# Patient Record
Sex: MALE | Race: WHITE | NOT HISPANIC OR LATINO | Employment: FULL TIME | URBAN - METROPOLITAN AREA
[De-identification: names, ages, dates, MRNs, and addresses within clinical notes are randomized per-mention and may not be internally consistent; named-entity substitution may affect disease eponyms.]

---

## 2024-07-21 ENCOUNTER — HOSPITAL ENCOUNTER (EMERGENCY)
Facility: HOSPITAL | Age: 39
Discharge: HOME | End: 2024-07-21
Payer: COMMERCIAL

## 2024-07-21 VITALS
OXYGEN SATURATION: 99 % | HEART RATE: 98 BPM | RESPIRATION RATE: 16 BRPM | DIASTOLIC BLOOD PRESSURE: 99 MMHG | BODY MASS INDEX: 29.66 KG/M2 | WEIGHT: 211.86 LBS | SYSTOLIC BLOOD PRESSURE: 146 MMHG | TEMPERATURE: 97.9 F | HEIGHT: 71 IN

## 2024-07-21 DIAGNOSIS — R11.2 NAUSEA AND VOMITING, UNSPECIFIED VOMITING TYPE: Primary | ICD-10-CM

## 2024-07-21 LAB
ALBUMIN SERPL-MCNC: 4.8 G/DL (ref 3.5–5)
ALP BLD-CCNC: 77 U/L (ref 35–125)
ALT SERPL-CCNC: 24 U/L (ref 5–40)
ANION GAP SERPL CALC-SCNC: 14 MMOL/L
AST SERPL-CCNC: 16 U/L (ref 5–40)
BASOPHILS # BLD AUTO: 0.03 X10*3/UL (ref 0–0.1)
BASOPHILS NFR BLD AUTO: 0.2 %
BILIRUB SERPL-MCNC: 0.4 MG/DL (ref 0.1–1.2)
BUN SERPL-MCNC: 10 MG/DL (ref 8–25)
CALCIUM SERPL-MCNC: 10.3 MG/DL (ref 8.5–10.4)
CHLORIDE SERPL-SCNC: 100 MMOL/L (ref 97–107)
CO2 SERPL-SCNC: 22 MMOL/L (ref 24–31)
CREAT SERPL-MCNC: 0.9 MG/DL (ref 0.4–1.6)
EGFRCR SERPLBLD CKD-EPI 2021: >90 ML/MIN/1.73M*2
EOSINOPHIL # BLD AUTO: 0.02 X10*3/UL (ref 0–0.7)
EOSINOPHIL NFR BLD AUTO: 0.1 %
ERYTHROCYTE [DISTWIDTH] IN BLOOD BY AUTOMATED COUNT: 12.4 % (ref 11.5–14.5)
GLUCOSE SERPL-MCNC: 123 MG/DL (ref 65–99)
HCT VFR BLD AUTO: 49.7 % (ref 41–52)
HGB BLD-MCNC: 17 G/DL (ref 13.5–17.5)
IMM GRANULOCYTES # BLD AUTO: 0.06 X10*3/UL (ref 0–0.7)
IMM GRANULOCYTES NFR BLD AUTO: 0.3 % (ref 0–0.9)
LACTATE BLDV-SCNC: 1.6 MMOL/L (ref 0.4–2)
LIPASE SERPL-CCNC: 37 U/L (ref 16–63)
LYMPHOCYTES # BLD AUTO: 2.17 X10*3/UL (ref 1.2–4.8)
LYMPHOCYTES NFR BLD AUTO: 11.2 %
MAGNESIUM SERPL-MCNC: 1.9 MG/DL (ref 1.6–3.1)
MCH RBC QN AUTO: 28.6 PG (ref 26–34)
MCHC RBC AUTO-ENTMCNC: 34.2 G/DL (ref 32–36)
MCV RBC AUTO: 84 FL (ref 80–100)
MONOCYTES # BLD AUTO: 1.1 X10*3/UL (ref 0.1–1)
MONOCYTES NFR BLD AUTO: 5.7 %
NEUTROPHILS # BLD AUTO: 16.02 X10*3/UL (ref 1.2–7.7)
NEUTROPHILS NFR BLD AUTO: 82.5 %
NRBC BLD-RTO: 0 /100 WBCS (ref 0–0)
PLATELET # BLD AUTO: 340 X10*3/UL (ref 150–450)
POTASSIUM SERPL-SCNC: 3.3 MMOL/L (ref 3.4–5.1)
PROT SERPL-MCNC: 8.4 G/DL (ref 5.9–7.9)
RBC # BLD AUTO: 5.95 X10*6/UL (ref 4.5–5.9)
SODIUM SERPL-SCNC: 136 MMOL/L (ref 133–145)
WBC # BLD AUTO: 19.4 X10*3/UL (ref 4.4–11.3)

## 2024-07-21 PROCEDURE — 83605 ASSAY OF LACTIC ACID: CPT

## 2024-07-21 PROCEDURE — 2500000001 HC RX 250 WO HCPCS SELF ADMINISTERED DRUGS (ALT 637 FOR MEDICARE OP)

## 2024-07-21 PROCEDURE — 2500000004 HC RX 250 GENERAL PHARMACY W/ HCPCS (ALT 636 FOR OP/ED)

## 2024-07-21 PROCEDURE — 87040 BLOOD CULTURE FOR BACTERIA: CPT | Mod: TRILAB

## 2024-07-21 PROCEDURE — 2500000002 HC RX 250 W HCPCS SELF ADMINISTERED DRUGS (ALT 637 FOR MEDICARE OP, ALT 636 FOR OP/ED)

## 2024-07-21 PROCEDURE — 85025 COMPLETE CBC W/AUTO DIFF WBC: CPT

## 2024-07-21 PROCEDURE — 96361 HYDRATE IV INFUSION ADD-ON: CPT

## 2024-07-21 PROCEDURE — 96375 TX/PRO/DX INJ NEW DRUG ADDON: CPT

## 2024-07-21 PROCEDURE — 36415 COLL VENOUS BLD VENIPUNCTURE: CPT

## 2024-07-21 PROCEDURE — 83690 ASSAY OF LIPASE: CPT

## 2024-07-21 PROCEDURE — 99284 EMERGENCY DEPT VISIT MOD MDM: CPT

## 2024-07-21 PROCEDURE — 96374 THER/PROPH/DIAG INJ IV PUSH: CPT

## 2024-07-21 PROCEDURE — 83735 ASSAY OF MAGNESIUM: CPT

## 2024-07-21 PROCEDURE — 80053 COMPREHEN METABOLIC PANEL: CPT

## 2024-07-21 RX ORDER — DICYCLOMINE HYDROCHLORIDE 10 MG/1
10 CAPSULE ORAL ONCE
Status: COMPLETED | OUTPATIENT
Start: 2024-07-21 | End: 2024-07-21

## 2024-07-21 RX ORDER — POTASSIUM CHLORIDE 20 MEQ/1
40 TABLET, EXTENDED RELEASE ORAL ONCE
Status: COMPLETED | OUTPATIENT
Start: 2024-07-21 | End: 2024-07-21

## 2024-07-21 RX ORDER — FAMOTIDINE 20 MG/1
20 TABLET, FILM COATED ORAL 2 TIMES DAILY
Qty: 30 TABLET | Refills: 0 | Status: SHIPPED | OUTPATIENT
Start: 2024-07-21 | End: 2024-08-05

## 2024-07-21 RX ORDER — ONDANSETRON HYDROCHLORIDE 2 MG/ML
4 INJECTION, SOLUTION INTRAVENOUS ONCE
Status: COMPLETED | OUTPATIENT
Start: 2024-07-21 | End: 2024-07-21

## 2024-07-21 RX ORDER — DICYCLOMINE HYDROCHLORIDE 20 MG/1
20 TABLET ORAL 2 TIMES DAILY
Qty: 20 TABLET | Refills: 0 | Status: SHIPPED | OUTPATIENT
Start: 2024-07-21 | End: 2024-07-31

## 2024-07-21 RX ORDER — ONDANSETRON 4 MG/1
4 TABLET, FILM COATED ORAL EVERY 6 HOURS
Qty: 12 TABLET | Refills: 0 | Status: SHIPPED | OUTPATIENT
Start: 2024-07-21 | End: 2024-07-24

## 2024-07-21 RX ORDER — FAMOTIDINE 10 MG/ML
20 INJECTION INTRAVENOUS ONCE
Status: COMPLETED | OUTPATIENT
Start: 2024-07-21 | End: 2024-07-21

## 2024-07-21 ASSESSMENT — COLUMBIA-SUICIDE SEVERITY RATING SCALE - C-SSRS
2. HAVE YOU ACTUALLY HAD ANY THOUGHTS OF KILLING YOURSELF?: NO
6. HAVE YOU EVER DONE ANYTHING, STARTED TO DO ANYTHING, OR PREPARED TO DO ANYTHING TO END YOUR LIFE?: NO
1. IN THE PAST MONTH, HAVE YOU WISHED YOU WERE DEAD OR WISHED YOU COULD GO TO SLEEP AND NOT WAKE UP?: NO

## 2024-07-21 ASSESSMENT — PAIN - FUNCTIONAL ASSESSMENT: PAIN_FUNCTIONAL_ASSESSMENT: 0-10

## 2024-07-21 ASSESSMENT — PAIN SCALES - GENERAL: PAINLEVEL_OUTOF10: 5 - MODERATE PAIN

## 2024-07-21 NOTE — ED NOTES
Patient refusing to stay for blood culture and lactate results. PA aware     Clarita Vaughn, RN  07/21/24 2957

## 2024-07-21 NOTE — ED PROVIDER NOTES
HPI   Chief Complaint   Patient presents with    Vomiting     Pt states he ate a gas station burrito last night and about 30 minutes later had abd pain, n/v and diarrhea.         Patient is a 39-year-old male presenting to the emergency department for evaluation of possible food poisoning.  Patient states last night around 3 AM he ate a gas station [employer] and within 30 minutes he was nauseous, vomiting, and had diarrhea.  He states he was up all night with diarrhea.  He states he has not been able to keep any food or liquid down all day.  He states he will try to take a sip of water and will vomit it right back up.  He also tried taking Pepto-Bismol and vomited right after.  He denies any chest pain, shortness of breath, fever, chills, abdominal pain, cough, congestion, headaches, numbness, tingling.               Patient History   History reviewed. No pertinent past medical history.  History reviewed. No pertinent surgical history.  No family history on file.  Social History     Tobacco Use    Smoking status: Not on file    Smokeless tobacco: Not on file   Substance Use Topics    Alcohol use: Not on file    Drug use: Not on file       Physical Exam   ED Triage Vitals [07/21/24 1529]   Temperature Heart Rate Respirations BP   36.6 °C (97.9 °F) (!) 130 16 (!) 146/99      Pulse Ox Temp src Heart Rate Source Patient Position   99 % -- -- --      BP Location FiO2 (%)     -- --       Physical Exam  Vitals and nursing note reviewed.   Constitutional:       General: He is not in acute distress.     Appearance: Normal appearance. He is not ill-appearing or toxic-appearing.   HENT:      Head: Normocephalic and atraumatic.      Nose: Nose normal.      Mouth/Throat:      Mouth: Mucous membranes are dry.   Eyes:      Pupils: Pupils are equal, round, and reactive to light.   Cardiovascular:      Rate and Rhythm: Regular rhythm. Tachycardia present.      Pulses: Normal pulses.      Heart sounds: No murmur heard.     No friction  rub. No gallop.   Pulmonary:      Effort: Pulmonary effort is normal.      Breath sounds: Normal breath sounds. No wheezing, rhonchi or rales.   Abdominal:      Palpations: Abdomen is soft.      Tenderness: There is no abdominal tenderness. There is no guarding or rebound.   Musculoskeletal:         General: Normal range of motion.      Cervical back: Normal range of motion.   Skin:     General: Skin is warm and dry.   Neurological:      General: No focal deficit present.      Mental Status: He is alert and oriented to person, place, and time.      Sensory: No sensory deficit.      Motor: No weakness.   Psychiatric:         Mood and Affect: Mood normal.         Behavior: Behavior normal.           ED Course & MDM   ED Course as of 07/21/24 1840   Sun Jul 21, 2024 1817 Heart Rate(!): 130 [AJ]      ED Course User Index  [AJ] Antonia Post PA-C         Diagnoses as of 07/21/24 1840   Nausea and vomiting, unspecified vomiting type                       [place] Coma Scale Score: 15                        Medical Decision Making  **Disclaimer parts of this chart have been completed using voice recognition software. Please excuse any errors of transcription.     Evaluated this patient independently and my supervising physician was available for consultation.    HPI: Detailed above.    Exam: A medically appropriate exam performed, outlined above, given the known history and presentation.    History obtained from: Patient    Labs/Diagnostics:  Labs Reviewed   CBC WITH AUTO DIFFERENTIAL - Abnormal       Result Value    WBC 19.4 (*)     nRBC 0.0      RBC 5.95 (*)     Hemoglobin 17.0      Hematocrit 49.7      MCV 84      MCH 28.6      MCHC 34.2      RDW 12.4      Platelets 340      Neutrophils % 82.5      Immature Granulocytes %, Automated 0.3      Lymphocytes % 11.2      Monocytes % 5.7      Eosinophils % 0.1      Basophils % 0.2      Neutrophils Absolute 16.02 (*)     Immature Granulocytes Absolute, Automated 0.06       Lymphocytes Absolute 2.17      Monocytes Absolute 1.10 (*)     Eosinophils Absolute 0.02      Basophils Absolute 0.03     COMPREHENSIVE METABOLIC PANEL - Abnormal    Glucose 123 (*)     Sodium 136      Potassium 3.3 (*)     Chloride 100      Bicarbonate 22 (*)     Urea Nitrogen 10      Creatinine 0.90      eGFR >90      Calcium 10.3      Albumin 4.8      Alkaline Phosphatase 77      Total Protein 8.4 (*)     AST 16      Bilirubin, Total 0.4      ALT 24      Anion Gap 14     LIPASE - Normal    Lipase 37     MAGNESIUM - Normal    Magnesium 1.90     BLOOD GAS LACTIC ACID, VENOUS - Normal    POCT Lactate, Venous 1.6     BLOOD CULTURE   BLOOD CULTURE     EMERGENCY DEPARTMENT COURSE and DIFFERENTIAL DIAGNOSIS/MDM:  Patient is a 39-year-old male presenting to the emergency department for evaluation of nausea, vomiting, and diarrhea.  On physical exam vital signs remarkable for hypertension and tachycardia but otherwise stable and patient is in no acute distress.  Suspect patient is tachycardic due to dehydration.  Abdomen is soft and nontender to palpation with no rebound or guarding.  Diagnostic labs ordered as well as normal saline, Pepcid, Zofran, and Bentyl.  CBC came back with leukocytosis but no anemia.  Due to the leukocytosis and tachycardia patient was triggering sepsis.  Therefore blood cultures and lactic acid ordered.  Do not feel patient is septic at this time therefore did not feel antibiotics were necessary however I did order 2 L of fluid.  CMP remarkable for potassium of 3.3 therefore patient was given 40 mill equivalents of oral potassium.  Suspect potassium is low due to the diarrhea and vomiting.  Lipase normal.  Magnesium normal.  Patient nontender on exam therefore do not feel that CT scan is necessary.  Patient is feeling much better after fluids and medications.  However due to refusing the lactic acid and blood cultures patient did have a signout AGAINST MEDICAL ADVICE.  I did tell the patient that  "the blood cultures are important to make sure that there is no infection throughout his body however he states he does not want them done at this time and is feeling better and wants to go home.  I told him that if patient goes home and gets worse he could die if he is truly septic patient voiced understanding and does not feel that he is septic.  Patient then decided that he would let us draw the blood cultures and lactic acid.  Lactic came back normal.  Patient's heart rate improved after fluids.  Patient was discharged in stable condition.  Suspect patient had food poisoning from the [employer].  He tolerated oral intake prior to discharge.  He will follow-up with primary care physician outpatient within the next 1 to 2 days.  He will return to the emergency department with any new or worsening symptoms.    The patient presented with a chief complaint of nausea, vomiting, diarrhea. The differential diagnosis associated with this patient's presentation includes viral gastritis, acute abdomen, food poisoning.     Vitals:    Vitals:    07/21/24 1529 07/21/24 1745   BP: (!) 146/99    Pulse: (!) 130 98   Resp: 16 16   Temp: 36.6 °C (97.9 °F)    SpO2: 99%    Weight: 96.1 kg (211 lb 13.8 oz)    Height: 1.803 m (5' 11\")      History Limited by:    None    Independent history obtained from:    None    External records reviewed:    None    Diagnostics interpreted by me:    None    Discussions with other clinicians:    None    Chronic conditions impacting care:    None    Social determinants of health affecting care:    None    Diagnostic tests considered but not performed: CT of the abdomen considered however patient is nontender to palpation and abdomen is soft    ED Medications managed:    Medications   ondansetron (Zofran) injection 4 mg (4 mg intravenous Given 7/21/24 1543)   sodium chloride 0.9 % bolus 1,000 mL (0 mL intravenous Stopped 7/21/24 1642)   famotidine PF (Pepcid) injection 20 mg (20 mg intravenous Given 7/21/24 " 1543)   dicyclomine (Bentyl) capsule 10 mg (10 mg oral Given 7/21/24 1543)   sodium chloride 0.9 % bolus 1,000 mL (0 mL intravenous Stopped 7/21/24 1740)   potassium chloride CR (Klor-Con M20) ER tablet 40 mEq (40 mEq oral Given 7/21/24 1635)       Prescription drugs considered:     Pepcid and Bentyl    Screenings:              Procedure  Procedures     Antonia Post PA-C  07/21/24 4445

## 2024-07-26 LAB
BACTERIA BLD CULT: NORMAL
BACTERIA BLD CULT: NORMAL

## 2024-10-19 ENCOUNTER — HOSPITAL ENCOUNTER (EMERGENCY)
Facility: HOSPITAL | Age: 39
Discharge: HOME | End: 2024-10-19
Payer: COMMERCIAL

## 2024-10-19 VITALS
HEIGHT: 71 IN | OXYGEN SATURATION: 100 % | RESPIRATION RATE: 16 BRPM | HEART RATE: 65 BPM | WEIGHT: 200 LBS | SYSTOLIC BLOOD PRESSURE: 138 MMHG | BODY MASS INDEX: 28 KG/M2 | DIASTOLIC BLOOD PRESSURE: 92 MMHG | TEMPERATURE: 99 F

## 2024-10-19 DIAGNOSIS — S01.81XA FACIAL LACERATION, INITIAL ENCOUNTER: Primary | ICD-10-CM

## 2024-10-19 PROCEDURE — 99283 EMERGENCY DEPT VISIT LOW MDM: CPT

## 2024-10-19 PROCEDURE — 2500000004 HC RX 250 GENERAL PHARMACY W/ HCPCS (ALT 636 FOR OP/ED)

## 2024-10-19 PROCEDURE — 12011 RPR F/E/E/N/L/M 2.5 CM/<: CPT

## 2024-10-19 RX ORDER — LIDOCAINE HYDROCHLORIDE 10 MG/ML
10 INJECTION, SOLUTION INFILTRATION; PERINEURAL ONCE
Status: COMPLETED | OUTPATIENT
Start: 2024-10-19 | End: 2024-10-19

## 2024-10-19 RX ADMIN — LIDOCAINE HYDROCHLORIDE 10 ML: 10 INJECTION, SOLUTION INFILTRATION; PERINEURAL at 21:17

## 2024-10-19 ASSESSMENT — PAIN SCALES - GENERAL
PAINLEVEL_OUTOF10: 0 - NO PAIN
PAINLEVEL_OUTOF10: 2

## 2024-10-19 ASSESSMENT — PAIN - FUNCTIONAL ASSESSMENT
PAIN_FUNCTIONAL_ASSESSMENT: 0-10
PAIN_FUNCTIONAL_ASSESSMENT: 0-10

## 2024-10-19 ASSESSMENT — PAIN DESCRIPTION - LOCATION: LOCATION: FACE
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
## 2024-10-30 ENCOUNTER — TELEPHONE (OUTPATIENT)
Dept: PRIMARY CARE | Facility: CLINIC | Age: 39
End: 2024-10-30
Payer: COMMERCIAL

## 2024-11-04 ENCOUNTER — APPOINTMENT (OUTPATIENT)
Dept: PRIMARY CARE | Facility: CLINIC | Age: 39
End: 2024-11-04
Payer: COMMERCIAL

## 2024-11-04 VITALS
WEIGHT: 216 LBS | SYSTOLIC BLOOD PRESSURE: 118 MMHG | OXYGEN SATURATION: 97 % | BODY MASS INDEX: 30.24 KG/M2 | HEART RATE: 74 BPM | HEIGHT: 71 IN | DIASTOLIC BLOOD PRESSURE: 76 MMHG

## 2024-11-04 DIAGNOSIS — Z48.02 ENCOUNTER FOR REMOVAL OF SUTURES: Primary | ICD-10-CM

## 2024-11-04 PROCEDURE — 3008F BODY MASS INDEX DOCD: CPT | Performed by: FAMILY MEDICINE

## 2024-11-04 PROCEDURE — S0630 REMOVAL OF SUTURES: HCPCS

## 2024-11-04 PROCEDURE — 99213 OFFICE O/P EST LOW 20 MIN: CPT | Performed by: FAMILY MEDICINE

## 2024-11-04 ASSESSMENT — PATIENT HEALTH QUESTIONNAIRE - PHQ9
2. FEELING DOWN, DEPRESSED OR HOPELESS: NOT AT ALL
SUM OF ALL RESPONSES TO PHQ9 QUESTIONS 1 AND 2: 0
1. LITTLE INTEREST OR PLEASURE IN DOING THINGS: NOT AT ALL
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
## 2024-11-05 NOTE — PROGRESS NOTES
I reviewed and examined the patient. I was present for the key exam elements, and I fully participated in the patient's care. I discussed the management of the care with the resident. I have personally reviewed the pertinent labs and imaging, as well as recent notes, with the patient. I have reviewed the note above and agree with the resident's medical decision making as documented in the resident's note, in addition to the following comments / findings:     Agree with the rest of the plan outlined below by resident physician. No red flags.      The patient understands and agrees to the assessment and plan of care. Patient has also agreed to follow up and comply with the treatment and evaluation as recommended today. Patient was instructed to call the office at [phone] should questions arise regarding their treatment or care.     Darren Monroy DO, FAOASM  Family Medicine   [address], [address]  [place] [address]     Darren Monroy DO

## 2025-05-05 ENCOUNTER — APPOINTMENT (OUTPATIENT)
Dept: PRIMARY CARE | Facility: CLINIC | Age: 40
End: 2025-05-05
Payer: COMMERCIAL

## 2025-05-05 VITALS
OXYGEN SATURATION: 99 % | HEART RATE: 64 BPM | HEIGHT: 71 IN | DIASTOLIC BLOOD PRESSURE: 84 MMHG | BODY MASS INDEX: 29.54 KG/M2 | WEIGHT: 211 LBS | SYSTOLIC BLOOD PRESSURE: 118 MMHG

## 2025-05-05 DIAGNOSIS — Z13.29 SCREENING FOR ENDOCRINE DISORDER: ICD-10-CM

## 2025-05-05 DIAGNOSIS — F17.200 VAPING NICOTINE DEPENDENCE, NON-TOBACCO PRODUCT: ICD-10-CM

## 2025-05-05 DIAGNOSIS — Z13.0 SCREENING FOR DISORDER OF BLOOD AND BLOOD-FORMING ORGANS: ICD-10-CM

## 2025-05-05 DIAGNOSIS — Z13.220 LIPID SCREENING: ICD-10-CM

## 2025-05-05 DIAGNOSIS — Z00.00 ANNUAL WELLNESS VISIT: Primary | ICD-10-CM

## 2025-05-05 DIAGNOSIS — Z23 NEED FOR DIPHTHERIA-TETANUS-PERTUSSIS (TDAP) VACCINE: ICD-10-CM

## 2025-05-05 DIAGNOSIS — Z13.1 SCREENING FOR DIABETES MELLITUS: ICD-10-CM

## 2025-05-05 DIAGNOSIS — G47.20 CIRCADIAN RHYTHM SLEEP DISORDER: ICD-10-CM

## 2025-05-05 DIAGNOSIS — H35.82 RETINAL ISCHEMIA: ICD-10-CM

## 2025-05-05 DIAGNOSIS — R53.83 FATIGUE, UNSPECIFIED TYPE: ICD-10-CM

## 2025-05-05 PROBLEM — Z48.02 ENCOUNTER FOR REMOVAL OF SUTURES: Status: RESOLVED | Noted: 2024-11-04 | Resolved: 2025-05-05

## 2025-05-05 PROBLEM — F17.210 DEPENDENCE ON NICOTINE FROM CIGARETTES: Status: ACTIVE | Noted: 2018-08-27

## 2025-05-05 PROCEDURE — 90471 IMMUNIZATION ADMIN: CPT

## 2025-05-05 PROCEDURE — 99396 PREV VISIT EST AGE 40-64: CPT

## 2025-05-05 PROCEDURE — 3008F BODY MASS INDEX DOCD: CPT

## 2025-05-05 PROCEDURE — 90715 TDAP VACCINE 7 YRS/> IM: CPT

## 2025-05-05 RX ORDER — BISMUTH SUBSALICYLATE 262 MG
1 TABLET,CHEWABLE ORAL DAILY
COMMUNITY

## 2025-05-05 ASSESSMENT — PATIENT HEALTH QUESTIONNAIRE - PHQ9
SUM OF ALL RESPONSES TO PHQ9 QUESTIONS 1 AND 2: 0
1. LITTLE INTEREST OR PLEASURE IN DOING THINGS: NOT AT ALL
2. FEELING DOWN, DEPRESSED OR HOPELESS: NOT AT ALL

## 2025-05-05 NOTE — PROGRESS NOTES
"Subjective   Patient ID: Da Jo is a 39 y.o. male who presents for Annual Exam.    HPI  Sleep: started working second shift 1.5 years ago, schedule slightly abnormal and occasionally has insomnia  Working on a making diet changes.   Working on adding regular exercise to their routine.  Outside time: getting mail, taking out garbage, walking to car.   Colonoscopy not due until Age 44.  Vaccines are not up to date; they are due for: tdap   Dental exam is up to date.  Vision exam is up to date. Foveal retinal ischemia. Has glasses but struggles with night vision/night driving      Social:   Caffeine use -  coffee or energy drinks on occasion   Tobacco use-  Used to smoke cigarettes, quit several years ago. 10 years 1/4 PPD, Now vapes with nicotine. Trying to quit.         Alcohol use- denies   Illicit substance use - denies     Occupation: Machinery/programming      Other concerns addressed today include:   Feeling low energy, not as much energy. Feels like he is slowing down since turning 39//job shift change.     Fam hx:   Dad with CLL   Family with diabetes     Review of Systems  All pertinent positive symptoms are included in the history of present illness.    All other systems have been reviewed and are negative and noncontributory to this patient's current ailments.     Social History     Tobacco Use    Smoking status: Unknown    Smokeless tobacco: Not on file   Substance Use Topics    Alcohol use: Defer      Surgical History[1]   Allergies[2]     Current Medications[3]     Problem List[4]   Immunization History   Administered Date(s) Administered    COVID-19, mRNA, LNP-S, PF, 30 mcg/0.3 mL dose 04/04/2021, 04/23/2021, 12/29/2021    Moderna COVID-19 vaccine, 12 years and older (50mcg/0.5mL)(Spikevax) 11/29/2023    Pfizer Gray Cap SARS-CoV-2 05/09/2022    Tdap vaccine, age 7 year and older (BOOSTRIX, ADACEL) 05/05/2025       Objective     VITALS  /84   Pulse 64   Ht 1.803 m (5' 11\")   Wt 95.7 kg " (211 lb)   SpO2 99%   BMI 29.43 kg/m²      Physical Exam  CONSTITUTIONAL -  well developed, looks like stated age, in no acute distress, not ill-appearing, and + tired appearing, pale appearance  SKIN - normal skin color and pigmentation, normal skin turgor without rash, lesions, or nodules visualized  HEAD - no trauma, normocephalic  EYES - pupils are equal and reactive to light, extraocular muscles are intact, and normal external exam  ENT - no injection, no signs of infection, uvula midline, normal tongue movement and throat normal, no exudate, nasal passage without discharge and patent  NECK - supple without rigidity, no neck mass was observed, no thyromegaly or thyroid nodules  RESPIRATORY - clear to auscultation, no wheezing, no crackles and no rales, good effort  CARDIAC - regular rate and regular rhythm, no skipped beats, no murmur  ABDOMEN - soft, nontender  EXTREMITIES - no edema, no deformities  NEUROLOGICAL - normal gait, normal balance, normal motor, no ataxia, DTRs equal and symmetrical; alert, oriented and no focal signs  PSYCHIATRIC - alert, pleasant and cordial, age-appropriate  IMMUNOLOGIC - no cervical lymphadenopathy     No results found for this or any previous visit (from the past 12 weeks).     Assessment/Plan   Problem List Items Addressed This Visit           ICD-10-CM    Annual wellness visit - Primary Z00.00    Relevant Orders    CBC and Auto Differential    Comprehensive Metabolic Panel    Lipid Panel    TSH with reflex to Free T4 if abnormal    Screening for endocrine disorder Z13.29    Relevant Orders    TSH with reflex to Free T4 if abnormal    Hemoglobin A1c    Screening for diabetes mellitus Z13.1    Relevant Orders    Hemoglobin A1c    Screening for disorder of blood and blood-forming organs Z13.0    Relevant Orders    CBC and Auto Differential    Lipid screening Z13.220    Relevant Orders    Lipid Panel    Retinal ischemia H35.82    Relevant Orders    Hemoglobin A1c    Circadian  rhythm sleep disorder G47.20    Vaping nicotine dependence, non-tobacco product F17.200    Need for diphtheria-tetanus-pertussis (Tdap) vaccine Z23    Relevant Orders    Tdap vaccine, age 7 years and older  (BOOSTRIX) (Completed)     38 y/o M appears for annual well visit  Labs   Tdap vaccine given in office today   Fatigue   Likely due to circadian rhythm disruption due to second shift change   Discussed at length sleep hygiene methods and provided instructions on AVS  Testosterone testing is not indicated at this time, get basic labs first and try lifestyle changes  Labs to rule out basic causes of fatigue such as anemia, etc  Return in 4-6 weeks if this is still a significant problem affecting daily life   Vaping   Discussed he is trying to quit  Re-visit smoking cessation at next office visit      I have personally reviewed all available pertinent labs, imaging, and consult notes with the patient.      All questions and concerns were addressed. Patient verbalizes understanding instructions and agrees with established plan of care.      Patient seen and discussed with Dr. Porfirio Soto, , MA   PGY-1 Pending sale to [facility]         [1] No past surgical history on file.  [2] No Known Allergies  [3]   Current Outpatient Medications   Medication Sig Dispense Refill    multivitamin tablet Take 1 tablet by mouth once daily.       No current facility-administered medications for this visit.   [4]   Patient Active Problem List  Diagnosis    Annual wellness visit    Dependence on nicotine from cigarettes    Screening for endocrine disorder    Screening for diabetes mellitus    Screening for disorder of blood and blood-forming organs    Lipid screening    Retinal ischemia    Circadian rhythm sleep disorder    Vaping nicotine dependence, non-tobacco product    Need for diphtheria-tetanus-pertussis (Tdap) vaccine

## 2025-05-05 NOTE — PROGRESS NOTES
I reviewed and examined the patient. I was present for the key exam elements, and I fully participated in the patient's care. I discussed the management of the care with the resident. I have personally reviewed the pertinent labs and imaging, as well as recent notes, with the patient. I have reviewed the note above and agree with the resident's medical decision making as documented in the resident's note, in addition to the following comments / findings:     Agree with the rest of the plan outlined below by resident physician. No red flags.      The patient understands and agrees to the assessment and plan of care. Patient has also agreed to follow up and comply with the treatment and evaluation as recommended today. Patient was instructed to call the office at [phone] should questions arise regarding their treatment or care.     Darren Monroy DO, FAOASM  Family Medicine   [address], [address]  [place] [address]     Darren Monroy DO

## 2025-05-05 NOTE — PATIENT INSTRUCTIONS
Today we discussed trying sleep hygiene methods:   -Avoiding technology at night   -Avoiding blue lights at night   -Avoiding stimulation before bed   -Keep your room dark and cold where you're sleeping   -You can use white noise to help you fall asleep    -You can try blue light blocking glasses at night  -Track your sleep with your iWatch or fitness tracker   -Physically go to bed in the bedroom.   -Keep the same daily sleep/wake schedule  -keep a similar meal time schedule daily

## 2025-05-13 LAB
ALBUMIN SERPL-MCNC: 4.5 G/DL (ref 3.6–5.1)
ALP SERPL-CCNC: 159 U/L (ref 36–130)
ALT SERPL-CCNC: 307 U/L (ref 9–46)
ANION GAP SERPL CALCULATED.4IONS-SCNC: 9 MMOL/L (CALC) (ref 7–17)
AST SERPL-CCNC: 224 U/L (ref 10–40)
BASOPHILS # BLD AUTO: 20 CELLS/UL (ref 0–200)
BASOPHILS NFR BLD AUTO: 0.2 %
BILIRUB SERPL-MCNC: 5.2 MG/DL (ref 0.2–1.2)
BUN SERPL-MCNC: 9 MG/DL (ref 7–25)
CALCIUM SERPL-MCNC: 9.5 MG/DL (ref 8.6–10.3)
CHLORIDE SERPL-SCNC: 105 MMOL/L (ref 98–110)
CHOLEST SERPL-MCNC: 162 MG/DL
CHOLEST/HDLC SERPL: 3.2 (CALC)
CO2 SERPL-SCNC: 25 MMOL/L (ref 20–32)
CREAT SERPL-MCNC: 0.91 MG/DL (ref 0.6–1.29)
EGFRCR SERPLBLD CKD-EPI 2021: 109 ML/MIN/1.73M2
EOSINOPHIL # BLD AUTO: 140 CELLS/UL (ref 15–500)
EOSINOPHIL NFR BLD AUTO: 1.4 %
ERYTHROCYTE [DISTWIDTH] IN BLOOD BY AUTOMATED COUNT: 12.8 % (ref 11–15)
EST. AVERAGE GLUCOSE BLD GHB EST-MCNC: 103 MG/DL
EST. AVERAGE GLUCOSE BLD GHB EST-SCNC: 5.7 MMOL/L
GLUCOSE SERPL-MCNC: 103 MG/DL (ref 65–99)
HBA1C MFR BLD: 5.2 %
HCT VFR BLD AUTO: 50.8 % (ref 38.5–50)
HDLC SERPL-MCNC: 50 MG/DL
HGB BLD-MCNC: 16.8 G/DL (ref 13.2–17.1)
LDLC SERPL CALC-MCNC: 94 MG/DL (CALC)
LYMPHOCYTES # BLD AUTO: 1990 CELLS/UL (ref 850–3900)
LYMPHOCYTES NFR BLD AUTO: 19.9 %
MCH RBC QN AUTO: 28.8 PG (ref 27–33)
MCHC RBC AUTO-ENTMCNC: 33.1 G/DL (ref 32–36)
MCV RBC AUTO: 87 FL (ref 80–100)
MONOCYTES # BLD AUTO: 890 CELLS/UL (ref 200–950)
MONOCYTES NFR BLD AUTO: 8.9 %
NEUTROPHILS # BLD AUTO: 6960 CELLS/UL (ref 1500–7800)
NEUTROPHILS NFR BLD AUTO: 69.6 %
NONHDLC SERPL-MCNC: 112 MG/DL (CALC)
PLATELET # BLD AUTO: 308 THOUSAND/UL (ref 140–400)
PMV BLD REES-ECKER: 11.1 FL (ref 7.5–12.5)
POTASSIUM SERPL-SCNC: 4.1 MMOL/L (ref 3.5–5.3)
PROT SERPL-MCNC: 7.3 G/DL (ref 6.1–8.1)
RBC # BLD AUTO: 5.84 MILLION/UL (ref 4.2–5.8)
SODIUM SERPL-SCNC: 139 MMOL/L (ref 135–146)
TRIGL SERPL-MCNC: 85 MG/DL
TSH SERPL-ACNC: 1.67 MIU/L (ref 0.4–4.5)
WBC # BLD AUTO: 10 THOUSAND/UL (ref 3.8–10.8)

## 2025-05-15 DIAGNOSIS — R17 ELEVATED BILIRUBIN: ICD-10-CM

## 2025-05-15 DIAGNOSIS — R74.8 ELEVATED LIVER ENZYMES: Primary | ICD-10-CM

## 2025-05-15 DIAGNOSIS — R71.8 ELEVATED HEMATOCRIT: ICD-10-CM

## 2025-05-15 DIAGNOSIS — R74.8 ELEVATED ALKALINE PHOSPHATASE LEVEL: ICD-10-CM

## 2025-06-23 ENCOUNTER — APPOINTMENT (OUTPATIENT)
Dept: RADIOLOGY | Facility: HOSPITAL | Age: 40
End: 2025-06-23
Payer: COMMERCIAL

## 2025-06-23 DIAGNOSIS — R17 ELEVATED BILIRUBIN: ICD-10-CM

## 2025-06-23 DIAGNOSIS — R74.8 ELEVATED LIVER ENZYMES: ICD-10-CM

## 2025-06-23 DIAGNOSIS — R74.8 ELEVATED ALKALINE PHOSPHATASE LEVEL: ICD-10-CM

## 2025-06-23 PROCEDURE — 76705 ECHO EXAM OF ABDOMEN: CPT

## 2025-06-23 PROCEDURE — 76705 ECHO EXAM OF ABDOMEN: CPT | Performed by: RADIOLOGY

## 2025-06-24 DIAGNOSIS — K80.20 CALCULUS OF GALLBLADDER WITHOUT CHOLECYSTITIS WITHOUT OBSTRUCTION: Primary | ICD-10-CM

## 2025-06-25 ENCOUNTER — TELEPHONE (OUTPATIENT)
Facility: CLINIC | Age: 40
End: 2025-06-25
Payer: COMMERCIAL

## 2025-06-25 NOTE — TELEPHONE ENCOUNTER
Patient saw the referral in [facility] after his ultrasound and had questions.

## 2025-06-26 DIAGNOSIS — B19.20 HEPATITIS C VIRUS INFECTION WITHOUT HEPATIC COMA, UNSPECIFIED CHRONICITY: Primary | ICD-10-CM

## 2025-06-26 LAB
BILIRUB DIRECT SERPL-MCNC: 0.1 MG/DL
BILIRUB SERPL-MCNC: 0.7 MG/DL (ref 0.2–1.2)
FERRITIN SERPL-MCNC: 75 NG/ML (ref 38–380)
GGT SERPL-CCNC: 33 U/L (ref 3–95)
HAV IGM SERPL QL IA: ABNORMAL
HBV CORE IGM SERPL QL IA: ABNORMAL
HBV SURFACE AG SERPL QL IA: ABNORMAL
HCV AB SERPL QL IA: REACTIVE
HCV RNA SERPL NAA+PROBE-ACNC: ABNORMAL IU/ML
HCV RNA SERPL NAA+PROBE-LOG IU: 5.73 LOG IU/ML
IRON SATN MFR SERPL: 24 % (CALC) (ref 20–48)
IRON SERPL-MCNC: 81 MCG/DL (ref 50–180)
QUEST HCV PCR (ALWAYS MESSAGE): ABNORMAL
TIBC SERPL-MCNC: 338 MCG/DL (CALC) (ref 250–425)

## 2025-07-01 ENCOUNTER — TELEPHONE (OUTPATIENT)
Facility: CLINIC | Age: 40
End: 2025-07-01
Payer: COMMERCIAL

## 2025-07-01 NOTE — TELEPHONE ENCOUNTER
Patient calling because they are looking for a referral for hematology that you said your going to place for them,

## 2025-07-03 DIAGNOSIS — B17.10 ACUTE HEPATITIS C VIRUS INFECTION WITHOUT HEPATIC COMA: Primary | ICD-10-CM

## 2025-07-11 ENCOUNTER — APPOINTMENT (OUTPATIENT)
Dept: SURGERY | Facility: CLINIC | Age: 40
End: 2025-07-11
Payer: COMMERCIAL

## 2025-07-11 DIAGNOSIS — K80.50 BILIARY COLIC: Primary | ICD-10-CM

## 2025-07-11 DIAGNOSIS — B17.10 ACUTE HEPATITIS C VIRUS INFECTION WITHOUT HEPATIC COMA: ICD-10-CM

## 2025-07-11 DIAGNOSIS — K80.20 CALCULUS OF GALLBLADDER WITHOUT CHOLECYSTITIS WITHOUT OBSTRUCTION: ICD-10-CM

## 2025-07-11 PROCEDURE — 99204 OFFICE O/P NEW MOD 45 MIN: CPT | Performed by: SURGERY

## 2025-07-11 NOTE — PROGRESS NOTES
General Surgery History and Physical    Referring Provider:  DO Porfirio Chavira Louis D, DO     Chief Complaint:  Gallstones    History of Present Illness:  This is a 40 y.o. male who presents with about 4 months of epigastric and right upper quadrant pain, as well as feeling ill and nauseous.  Reports that he does not know of any episodes that could have led to contraction of hepatitis C.  However, 1 point during these episodes of the last 4 months, it seems to be triggered after eating a gas station burrito.  Aside from that, his episodes of nausea and pain do not seem to be associated with eating specifically.  He has been to an urgent care once, and the emergency room twice.    Past Medical History:  Hepatitis C: New diagnosis of acute infection    Past Surgical History:  Open appendectomy  Hemorrhoidectomy    Medications:  Patient denies any    Allergies:  No known drug allergies    Family History:  Diabetes    Social History:  Single.  Works as a .  Vapes.  Denies alcohol and illicits.    Review of Systems:  A complete 12 point review of systems was performed and is negative except as noted in the history of present illness.      Vital Signs:  There were no vitals filed for this visit.       Physical Exam:  General: No acute distress. Sitting up in bed.   Neuro: Alert and oriented ×3. Follows commands.  Head: Atraumatic  Eyes: Pupils equal reactive to light. Extraocular motions intact.  Ears: Hears normal speaking voice.  Mouth, Nose, Throat: Mucous membranes moist.  Normal dentition.  Neck: Supple. No appreciable masses.  Chest: No crepitus.  No appreciable scars.  Heart: Regular rate and rhythm.  Lung: Clear to auscultation bilaterally.  Vascular: No carotid bruits.  Palpable radial pulses bilaterally.  Abdomen: Soft. Nondistended.  Right upper quadrant tenderness.  Well-healed right lower quadrant scar.  Musculoskeletal: Moves all extremities.  Normal range of motion.  Lymphatic: No  palpable lymph nodes.  Skin: No rashes or lesions.  Psychological: Normal affect      Laboratory Values:  CBC:   Lab Results   Component Value Date    WBC 10.0 05/12/2025    RBC 5.84 (H) 05/12/2025    HGB 16.8 05/12/2025    HCT 50.8 (H) 05/12/2025     05/12/2025       RFP:   Lab Results   Component Value Date     05/12/2025    K 4.1 05/12/2025     05/12/2025    CO2 25 05/12/2025    BUN 9 05/12/2025    CREATININE 0.91 05/12/2025    CALCIUM 9.5 05/12/2025    MG 1.90 07/21/2024        LFTs:   Lab Results   Component Value Date    PROT 7.3 05/12/2025    ALBUMIN 4.5 05/12/2025    BILITOT 0.7 06/23/2025    BILIDIR 0.1 06/23/2025    ALKPHOS 159 (H) 05/12/2025     (H) 05/12/2025     (H) 05/12/2025            Imaging:  I have personally reviewed the images and the radiologist's report.  Right upper quadrant ultrasound: Contracted gallbladder with stones      Assessment:  This is a 40 y.o. male who presents with a new diagnosis of acute hepatitis C with transaminitis.  He also has an ultrasound concerning for a contracted gallbladder full of stones.  We discussed that on my review of the images, I certainly see the concern for gallstones, but believe this might also be simply due to contraction of the gallbladder.  Either way, we discussed that I believe most of his symptoms are driven by the acute hepatitis C, and that the acute hepatitis C is more important at this time.  We discussed that I therefore recommend following up with hepatology to potentially cure the hepatitis C, and then reevaluate the gallbladder thereafter.  In the interim, I would recommend a low-fat diet.      Plan:  -- Referral to hepatology  -- Low-fat diet  -- Follow-up to reevaluate the gallbladder after starting treatment for hepatitis C      Siva Yan MD  General Surgery  Office: [phone]  Fax:     [phone]  11:08 AM   07/11/25

## 2025-08-15 ENCOUNTER — APPOINTMENT (OUTPATIENT)
Facility: CLINIC | Age: 40
End: 2025-08-15
Payer: COMMERCIAL

## 2025-08-15 VITALS — BODY MASS INDEX: 29.6 KG/M2 | WEIGHT: 211.4 LBS | HEIGHT: 71 IN

## 2025-08-15 DIAGNOSIS — R74.8 ELEVATED LIVER ENZYMES: ICD-10-CM

## 2025-08-15 DIAGNOSIS — B19.20 HEPATITIS C VIRUS INFECTION WITHOUT HEPATIC COMA, UNSPECIFIED CHRONICITY: Primary | ICD-10-CM

## 2025-08-15 LAB — QUEST FLEXITEST1 RESULTS:: NORMAL

## 2025-08-15 PROCEDURE — 99203 OFFICE O/P NEW LOW 30 MIN: CPT | Performed by: STUDENT IN AN ORGANIZED HEALTH CARE EDUCATION/TRAINING PROGRAM

## 2025-08-15 PROCEDURE — 3008F BODY MASS INDEX DOCD: CPT | Performed by: STUDENT IN AN ORGANIZED HEALTH CARE EDUCATION/TRAINING PROGRAM

## 2025-08-15 ASSESSMENT — ENCOUNTER SYMPTOMS
BLOOD IN STOOL: 0
NAUSEA: 0
TROUBLE SWALLOWING: 0
ABDOMINAL DISTENTION: 0
CONSTIPATION: 0
ABDOMINAL PAIN: 0
DIARRHEA: 0
UNEXPECTED WEIGHT CHANGE: 0
ANAL BLEEDING: 0
CHILLS: 0
COLOR CHANGE: 0
VOMITING: 0
RECTAL PAIN: 0
SHORTNESS OF BREATH: 0
FEVER: 0
FATIGUE: 1

## 2025-08-17 LAB
ALBUMIN SERPL-MCNC: 4.3 G/DL (ref 3.6–5.1)
ALP SERPL-CCNC: 58 U/L (ref 36–130)
ALT SERPL-CCNC: 20 U/L (ref 9–46)
ANION GAP SERPL CALCULATED.4IONS-SCNC: 9 MMOL/L (CALC) (ref 7–17)
AST SERPL-CCNC: 15 U/L (ref 10–40)
BILIRUB SERPL-MCNC: 0.4 MG/DL (ref 0.2–1.2)
BUN SERPL-MCNC: 15 MG/DL (ref 7–25)
CALCIUM SERPL-MCNC: 8.8 MG/DL (ref 8.6–10.3)
CHLORIDE SERPL-SCNC: 107 MMOL/L (ref 98–110)
CO2 SERPL-SCNC: 22 MMOL/L (ref 20–32)
CREAT SERPL-MCNC: 0.82 MG/DL (ref 0.6–1.29)
EGFRCR SERPLBLD CKD-EPI 2021: 113 ML/MIN/1.73M2
ERYTHROCYTE [DISTWIDTH] IN BLOOD BY AUTOMATED COUNT: 12.7 % (ref 11–15)
GLUCOSE SERPL-MCNC: 87 MG/DL (ref 65–99)
HCT VFR BLD AUTO: 47 % (ref 38.5–50)
HCV RNA SERPL NAA+PROBE-ACNC: ABNORMAL IU/ML
HCV RNA SERPL NAA+PROBE-LOG IU: 5.67 LOG IU/ML
HGB BLD-MCNC: 15.5 G/DL (ref 13.2–17.1)
MCH RBC QN AUTO: 28.9 PG (ref 27–33)
MCHC RBC AUTO-ENTMCNC: 33 G/DL (ref 32–36)
MCV RBC AUTO: 87.5 FL (ref 80–100)
PLATELET # BLD AUTO: 264 THOUSAND/UL (ref 140–400)
PMV BLD REES-ECKER: 11 FL (ref 7.5–12.5)
POTASSIUM SERPL-SCNC: 3.8 MMOL/L (ref 3.5–5.3)
PROT SERPL-MCNC: 6.8 G/DL (ref 6.1–8.1)
QUEST HCV PCR WITH GENOTYPE REFLEX (ALWAYS MESSAGE): ABNORMAL
RBC # BLD AUTO: 5.37 MILLION/UL (ref 4.2–5.8)
SODIUM SERPL-SCNC: 138 MMOL/L (ref 135–146)
WBC # BLD AUTO: 7.9 THOUSAND/UL (ref 3.8–10.8)

## 2025-08-21 LAB
ALBUMIN SERPL-MCNC: 4.3 G/DL (ref 3.6–5.1)
ALP SERPL-CCNC: 58 U/L (ref 36–130)
ALT SERPL-CCNC: 20 U/L (ref 9–46)
ANION GAP SERPL CALCULATED.4IONS-SCNC: 9 MMOL/L (CALC) (ref 7–17)
AST SERPL-CCNC: 15 U/L (ref 10–40)
BILIRUB SERPL-MCNC: 0.4 MG/DL (ref 0.2–1.2)
BUN SERPL-MCNC: 15 MG/DL (ref 7–25)
CALCIUM SERPL-MCNC: 8.8 MG/DL (ref 8.6–10.3)
CHLORIDE SERPL-SCNC: 107 MMOL/L (ref 98–110)
CO2 SERPL-SCNC: 22 MMOL/L (ref 20–32)
CREAT SERPL-MCNC: 0.82 MG/DL (ref 0.6–1.29)
EGFRCR SERPLBLD CKD-EPI 2021: 113 ML/MIN/1.73M2
ERYTHROCYTE [DISTWIDTH] IN BLOOD BY AUTOMATED COUNT: 12.7 % (ref 11–15)
GLUCOSE SERPL-MCNC: 87 MG/DL (ref 65–99)
HCT VFR BLD AUTO: 47 % (ref 38.5–50)
HCV GENTYP SERPL NAA+PROBE: NORMAL
HCV RNA SERPL NAA+PROBE-ACNC: ABNORMAL IU/ML
HCV RNA SERPL NAA+PROBE-LOG IU: 5.67 LOG IU/ML
HGB BLD-MCNC: 15.5 G/DL (ref 13.2–17.1)
MCH RBC QN AUTO: 28.9 PG (ref 27–33)
MCHC RBC AUTO-ENTMCNC: 33 G/DL (ref 32–36)
MCV RBC AUTO: 87.5 FL (ref 80–100)
PLATELET # BLD AUTO: 264 THOUSAND/UL (ref 140–400)
PMV BLD REES-ECKER: 11 FL (ref 7.5–12.5)
POTASSIUM SERPL-SCNC: 3.8 MMOL/L (ref 3.5–5.3)
PROT SERPL-MCNC: 6.8 G/DL (ref 6.1–8.1)
QUEST HCV PCR WITH GENOTYPE REFLEX (ALWAYS MESSAGE): ABNORMAL
RBC # BLD AUTO: 5.37 MILLION/UL (ref 4.2–5.8)
SODIUM SERPL-SCNC: 138 MMOL/L (ref 135–146)
WBC # BLD AUTO: 7.9 THOUSAND/UL (ref 3.8–10.8)

## 2025-09-03 ENCOUNTER — APPOINTMENT (OUTPATIENT)
Facility: CLINIC | Age: 40
End: 2025-09-03
Payer: COMMERCIAL

## 2025-09-03 ASSESSMENT — ENCOUNTER SYMPTOMS
ANAL BLEEDING: 0
FEVER: 0
DIARRHEA: 0
VOMITING: 0
TROUBLE SWALLOWING: 0
ABDOMINAL PAIN: 0
SHORTNESS OF BREATH: 0
RECTAL PAIN: 0
ABDOMINAL DISTENTION: 0
CHILLS: 0
COLOR CHANGE: 0
NAUSEA: 0
BLOOD IN STOOL: 0
UNEXPECTED WEIGHT CHANGE: 0
CONSTIPATION: 0

## 2025-09-04 DIAGNOSIS — B18.2 CHRONIC HEPATITIS C WITHOUT HEPATIC COMA (MULTI): Primary | ICD-10-CM

## 2025-09-04 RX ORDER — GLECAPREVIR AND PIBRENTASVIR 40; 100 MG/1; MG/1
TABLET, FILM COATED ORAL
Qty: 84 TABLET | Refills: 1 | Status: SHIPPED | OUTPATIENT
Start: 2025-09-04 | End: 2025-09-05

## 2025-09-05 ENCOUNTER — SPECIALTY PHARMACY (OUTPATIENT)
Dept: PHARMACY | Facility: CLINIC | Age: 40
End: 2025-09-05

## 2025-09-05 DIAGNOSIS — B18.2 CHRONIC HEPATITIS C WITHOUT HEPATIC COMA (MULTI): Primary | ICD-10-CM

## 2025-09-05 RX ORDER — VELPATASVIR AND SOFOSBUVIR 100; 400 MG/1; MG/1
1 TABLET, FILM COATED ORAL DAILY
COMMUNITY
End: 2025-09-05

## 2025-09-05 RX ORDER — VELPATASVIR AND SOFOSBUVIR 100; 400 MG/1; MG/1
1 TABLET, FILM COATED ORAL DAILY
Qty: 28 TABLET | Refills: 2 | Status: SHIPPED | OUTPATIENT
Start: 2025-09-05 | End: 2025-11-28

## 2025-09-25 ENCOUNTER — APPOINTMENT (OUTPATIENT)
Dept: GASTROENTEROLOGY | Facility: CLINIC | Age: 40
End: 2025-09-25
Payer: COMMERCIAL

## 2025-11-12 ENCOUNTER — APPOINTMENT (OUTPATIENT)
Facility: CLINIC | Age: 40
End: 2025-11-12
Payer: COMMERCIAL

## 2025-11-19 ENCOUNTER — APPOINTMENT (OUTPATIENT)
Facility: CLINIC | Age: 40
End: 2025-11-19
Payer: COMMERCIAL

## 2026-05-05 ENCOUNTER — APPOINTMENT (OUTPATIENT)
Facility: CLINIC | Age: 41
End: 2026-05-05
Payer: COMMERCIAL